# Patient Record
Sex: MALE | Race: WHITE
[De-identification: names, ages, dates, MRNs, and addresses within clinical notes are randomized per-mention and may not be internally consistent; named-entity substitution may affect disease eponyms.]

---

## 2017-10-06 ENCOUNTER — HOSPITAL ENCOUNTER (EMERGENCY)
Dept: HOSPITAL 83 - ED | Age: 37
Discharge: HOME | End: 2017-10-06
Payer: SELF-PAY

## 2017-10-06 VITALS — BODY MASS INDEX: 24.34 KG/M2 | WEIGHT: 170 LBS | HEIGHT: 70 IN
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
## 2017-10-06 DIAGNOSIS — F17.200: ICD-10-CM
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
## 2017-10-06 DIAGNOSIS — Z98.890: ICD-10-CM

## 2017-10-06 DIAGNOSIS — S60.222A: Primary | ICD-10-CM

## 2017-10-06 DIAGNOSIS — Z79.899: ICD-10-CM
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
## 2017-10-06 DIAGNOSIS — R03.0: ICD-10-CM

## 2019-07-27 ENCOUNTER — HOSPITAL ENCOUNTER (INPATIENT)
Dept: HOSPITAL 83 - ED | Age: 39
LOS: 1 days | Discharge: LEFT BEFORE DISCHARGE | DRG: 605 | End: 2019-07-28
Attending: INTERNAL MEDICINE | Admitting: INTERNAL MEDICINE
Payer: SELF-PAY

## 2019-07-27 VITALS — SYSTOLIC BLOOD PRESSURE: 125 MMHG | DIASTOLIC BLOOD PRESSURE: 71 MMHG

## 2019-07-27 VITALS — WEIGHT: 169.76 LBS | BODY MASS INDEX: 23.77 KG/M2 | HEIGHT: 70.87 IN

## 2019-07-27 VITALS — DIASTOLIC BLOOD PRESSURE: 76 MMHG

## 2019-07-27 VITALS — SYSTOLIC BLOOD PRESSURE: 143 MMHG | DIASTOLIC BLOOD PRESSURE: 82 MMHG

## 2019-07-27 DIAGNOSIS — D72.810: ICD-10-CM

## 2019-07-27 DIAGNOSIS — E87.1: ICD-10-CM

## 2019-07-27 DIAGNOSIS — Z83.3: ICD-10-CM

## 2019-07-27 DIAGNOSIS — Z82.3: ICD-10-CM

## 2019-07-27 DIAGNOSIS — Z71.6: ICD-10-CM

## 2019-07-27 DIAGNOSIS — S60.551A: Primary | ICD-10-CM

## 2019-07-27 DIAGNOSIS — D64.9: ICD-10-CM
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
## 2019-07-27 DIAGNOSIS — F17.210: ICD-10-CM

## 2019-07-27 DIAGNOSIS — L03.113: ICD-10-CM
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
## 2019-07-27 DIAGNOSIS — F11.10: ICD-10-CM

## 2019-07-27 DIAGNOSIS — R73.9: ICD-10-CM

## 2019-07-27 DIAGNOSIS — F19.90: ICD-10-CM

## 2019-07-27 LAB
ALBUMIN SERPL-MCNC: 3.2 GM/DL (ref 3.1–4.5)
ALP SERPL-CCNC: 86 U/L (ref 45–117)
ALT SERPL W P-5'-P-CCNC: 24 U/L (ref 12–78)
AST SERPL-CCNC: 11 IU/L (ref 3–35)
BASOPHILS # BLD AUTO: 0 10*3/UL (ref 0–0.1)
BASOPHILS NFR BLD AUTO: 0.4 % (ref 0–1)
BUN SERPL-MCNC: 10 MG/DL (ref 7–24)
CHLORIDE SERPL-SCNC: 105 MMOL/L (ref 98–107)
CREAT SERPL-MCNC: 0.82 MG/DL (ref 0.7–1.3)
EOSINOPHIL # BLD AUTO: 0.1 10*3/UL (ref 0–0.4)
EOSINOPHIL # BLD AUTO: 1.1 % (ref 1–4)
ERYTHROCYTE [DISTWIDTH] IN BLOOD BY AUTOMATED COUNT: 13.4 % (ref 0–14.5)
HCT VFR BLD AUTO: 38.5 % (ref 42–52)
HGB BLD-MCNC: 13.1 G/DL (ref 14–18)
LIPASE SERPL-CCNC: 57 U/L (ref 73–393)
LYMPHOCYTES # BLD AUTO: 2.6 10*3/UL (ref 1.3–4.4)
LYMPHOCYTES NFR BLD AUTO: 23.5 % (ref 27–41)
MCH RBC QN AUTO: 30.7 PG (ref 27–31)
MCHC RBC AUTO-ENTMCNC: 34 G/DL (ref 33–37)
MCV RBC AUTO: 90.2 FL (ref 80–94)
MONOCYTES # BLD AUTO: 0.7 10*3/UL (ref 0.1–1)
MONOCYTES NFR BLD MANUAL: 6.2 % (ref 3–9)
NEUT #: 7.6 10*3/UL (ref 2.3–7.9)
NEUT %: 68.3 % (ref 47–73)
NRBC BLD QL AUTO: 0 % (ref 0–0)
PLATELET # BLD AUTO: 167 10*3/UL (ref 130–400)
PMV BLD AUTO: 11.3 FL (ref 9.6–12.3)
POTASSIUM SERPL-SCNC: 3.6 MMOL/L (ref 3.5–5.1)
PROT SERPL-MCNC: 7.9 GM/DL (ref 6.4–8.2)
RBC # BLD AUTO: 4.27 10*6/UL (ref 4.5–5.9)
SODIUM SERPL-SCNC: 133 MMOL/L (ref 136–145)
WBC NRBC COR # BLD AUTO: 11.1 10*3/UL (ref 4.8–10.8)

## 2019-07-28 VITALS — DIASTOLIC BLOOD PRESSURE: 77 MMHG

## 2019-07-28 VITALS — DIASTOLIC BLOOD PRESSURE: 80 MMHG | SYSTOLIC BLOOD PRESSURE: 138 MMHG

## 2019-07-28 VITALS — SYSTOLIC BLOOD PRESSURE: 114 MMHG | DIASTOLIC BLOOD PRESSURE: 68 MMHG

## 2019-07-28 LAB
ALBUMIN SERPL-MCNC: 2.9 GM/DL (ref 3.1–4.5)
ALP SERPL-CCNC: 82 U/L (ref 45–117)
ALT SERPL W P-5'-P-CCNC: 18 U/L (ref 12–78)
AST SERPL-CCNC: 11 IU/L (ref 3–35)
BASOPHILS # BLD AUTO: 0.1 10*3/UL (ref 0–0.1)
BASOPHILS NFR BLD AUTO: 0.5 % (ref 0–1)
BUN SERPL-MCNC: 11 MG/DL (ref 7–24)
CHLORIDE SERPL-SCNC: 105 MMOL/L (ref 98–107)
CHOLEST SERPL-MCNC: 114 MG/DL (ref ?–200)
CREAT SERPL-MCNC: 0.84 MG/DL (ref 0.7–1.3)
EOSINOPHIL # BLD AUTO: 0.3 10*3/UL (ref 0–0.4)
EOSINOPHIL # BLD AUTO: 2.8 % (ref 1–4)
ERYTHROCYTE [DISTWIDTH] IN BLOOD BY AUTOMATED COUNT: 13.3 % (ref 0–14.5)
HCT VFR BLD AUTO: 36.7 % (ref 42–52)
HDLC SERPL-MCNC: 29 MG/DL (ref 40–60)
HGB BLD-MCNC: 12.5 G/DL (ref 14–18)
LDLC SERPL DIRECT ASSAY-MCNC: 65 MG/DL (ref 9–159)
LYMPHOCYTES # BLD AUTO: 2.5 10*3/UL (ref 1.3–4.4)
LYMPHOCYTES NFR BLD AUTO: 27.5 % (ref 27–41)
MCH RBC QN AUTO: 30.9 PG (ref 27–31)
MCHC RBC AUTO-ENTMCNC: 34.1 G/DL (ref 33–37)
MCV RBC AUTO: 90.8 FL (ref 80–94)
MONOCYTES # BLD AUTO: 0.8 10*3/UL (ref 0.1–1)
MONOCYTES NFR BLD MANUAL: 8.2 % (ref 3–9)
NEUT #: 5.6 10*3/UL (ref 2.3–7.9)
NEUT %: 60.8 % (ref 47–73)
NRBC BLD QL AUTO: 0 10*3/UL (ref 0–0)
PHOSPHATE SERPL-MCNC: 3 MG/DL (ref 2.5–4.9)
PLATELET # BLD AUTO: 167 10*3/UL (ref 130–400)
PMV BLD AUTO: 12.1 FL (ref 9.6–12.3)
POTASSIUM SERPL-SCNC: 3.7 MMOL/L (ref 3.5–5.1)
PROT SERPL-MCNC: 7.3 GM/DL (ref 6.4–8.2)
RBC # BLD AUTO: 4.04 10*6/UL (ref 4.5–5.9)
SODIUM SERPL-SCNC: 136 MMOL/L (ref 136–145)
TRIGL SERPL-MCNC: 99 MG/DL (ref ?–150)
VLDLC SERPL CALC-MCNC: 20 MG/DL (ref 6–40)
WBC NRBC COR # BLD AUTO: 9.2 10*3/UL (ref 4.8–10.8)

## 2019-11-26 ENCOUNTER — HOSPITAL ENCOUNTER (INPATIENT)
Dept: HOSPITAL 83 - 5E | Age: 39
LOS: 3 days | Discharge: HOME | DRG: 897 | End: 2019-11-29
Attending: INTERNAL MEDICINE | Admitting: INTERNAL MEDICINE
Payer: MEDICAID

## 2019-11-26 VITALS — WEIGHT: 178.19 LBS | HEIGHT: 70 IN | BODY MASS INDEX: 25.51 KG/M2

## 2019-11-26 VITALS — SYSTOLIC BLOOD PRESSURE: 110 MMHG | DIASTOLIC BLOOD PRESSURE: 67 MMHG

## 2019-11-26 VITALS — SYSTOLIC BLOOD PRESSURE: 120 MMHG | DIASTOLIC BLOOD PRESSURE: 69 MMHG

## 2019-11-26 VITALS — DIASTOLIC BLOOD PRESSURE: 63 MMHG | SYSTOLIC BLOOD PRESSURE: 117 MMHG

## 2019-11-26 DIAGNOSIS — D72.829: ICD-10-CM

## 2019-11-26 DIAGNOSIS — Z82.3: ICD-10-CM

## 2019-11-26 DIAGNOSIS — R00.0: ICD-10-CM

## 2019-11-26 DIAGNOSIS — Z83.3: ICD-10-CM

## 2019-11-26 DIAGNOSIS — R73.9: ICD-10-CM

## 2019-11-26 DIAGNOSIS — Z71.6: ICD-10-CM

## 2019-11-26 DIAGNOSIS — F17.210: ICD-10-CM

## 2019-11-26 DIAGNOSIS — Z83.79: ICD-10-CM

## 2019-11-26 DIAGNOSIS — F11.23: Primary | ICD-10-CM

## 2019-11-26 LAB
ALBUMIN SERPL-MCNC: 3.8 GM/DL (ref 3.1–4.5)
ALP SERPL-CCNC: 109 U/L (ref 45–117)
ALT SERPL W P-5'-P-CCNC: 37 U/L (ref 12–78)
AMPHETAMINES UR QL SCN: < 1000
APPEARANCE UR: CLEAR
AST SERPL-CCNC: 23 IU/L (ref 3–35)
BACTERIA #/AREA URNS HPF: (no result) /[HPF]
BARBITURATES UR QL SCN: < 200
BASOPHILS # BLD AUTO: 0.1 10*3/UL (ref 0–0.1)
BASOPHILS NFR BLD AUTO: 0.6 % (ref 0–1)
BENZODIAZ UR QL SCN: < 200
BILIRUB UR QL STRIP: NEGATIVE
BUN SERPL-MCNC: 10 MG/DL (ref 7–24)
BZE UR QL SCN: < 300
CANNABINOIDS UR QL SCN: < 50
CHLORIDE SERPL-SCNC: 104 MMOL/L (ref 98–107)
COLOR UR: YELLOW
CREAT SERPL-MCNC: 0.9 MG/DL (ref 0.7–1.3)
EOSINOPHIL # BLD AUTO: 0.3 10*3/UL (ref 0–0.4)
EOSINOPHIL # BLD AUTO: 2.1 % (ref 1–4)
EPI CELLS #/AREA URNS HPF: (no result) /[HPF]
ERYTHROCYTE [DISTWIDTH] IN BLOOD BY AUTOMATED COUNT: 13.7 % (ref 0–14.5)
ETHANOL SERPL-MCNC: < 3 MG/DL (ref ?–3)
GLUCOSE UR QL: NEGATIVE
HCT VFR BLD AUTO: 46.1 % (ref 42–52)
HGB BLD-MCNC: 15.3 G/DL (ref 14–18)
HGB UR QL STRIP: NEGATIVE
INR BLD: 1 (ref 2–3.5)
KETONES UR QL STRIP: NEGATIVE
LEUKOCYTE ESTERASE UR QL STRIP: NEGATIVE
LYMPHOCYTES # BLD AUTO: 2.2 10*3/UL (ref 1.3–4.4)
LYMPHOCYTES NFR BLD AUTO: 18.4 % (ref 27–41)
MCH RBC QN AUTO: 30.8 PG (ref 27–31)
MCHC RBC AUTO-ENTMCNC: 33.2 G/DL (ref 33–37)
MCV RBC AUTO: 92.9 FL (ref 80–94)
METHADONE UR QL SCN: < 300
MONOCYTES # BLD AUTO: 0.6 10*3/UL (ref 0.1–1)
MONOCYTES NFR BLD MANUAL: 4.8 % (ref 3–9)
NEUT #: 8.6 10*3/UL (ref 2.3–7.9)
NEUT %: 73.6 % (ref 47–73)
NITRITE UR QL STRIP: NEGATIVE
NRBC BLD QL AUTO: 0 % (ref 0–0)
OPIATES UR QL SCN: < 300
PCP UR QL SCN: <  25
PH UR STRIP: 6.5 [PH] (ref 5–9)
PLATELET # BLD AUTO: 209 10*3/UL (ref 130–400)
PMV BLD AUTO: 11.5 FL (ref 9.6–12.3)
POTASSIUM SERPL-SCNC: 4 MMOL/L (ref 3.5–5.1)
PROT SERPL-MCNC: 9.1 GM/DL (ref 6.4–8.2)
RBC # BLD AUTO: 4.96 10*6/UL (ref 4.5–5.9)
RBC #/AREA URNS HPF: (no result) RBC/HPF (ref 0–2)
SODIUM SERPL-SCNC: 136 MMOL/L (ref 136–145)
SP GR UR: <= 1.005 (ref 1–1.03)
UROBILINOGEN UR STRIP-MCNC: 0.2 E.U./DL (ref 0.2–1)
WBC #/AREA URNS HPF: (no result) WBC/HPF (ref 0–5)
WBC NRBC COR # BLD AUTO: 11.7 10*3/UL (ref 4.8–10.8)

## 2019-11-26 NOTE — NUR
39 year old MALE admitted to room # 517 for stabilization. Reports an
addiction to HEROIN last used 48 hours prior to admission. Compliant with
admission procedure.  Patient denies any anxiety, but is unable to sit still,
taps toes to floor continuously.  See assessment forms for additional
information about patient status.

## 2019-11-26 NOTE — NUR
Patient displaying withdrawal symptoms, including: irritability, anxiousness,
restlessness and agitation. Scheduled/PRN medications provided, SEE EMAR.
Will continue to monitor medication effectiveness.

## 2019-11-26 NOTE — NUR
PATIENT MEETS [facility]. CINA=19. PATIENT WANTS TO FOLLOW UP WITH
[facility] FOR OUTPATIENT TREATMENT. LOGAN LANZA.

## 2019-11-27 VITALS — DIASTOLIC BLOOD PRESSURE: 71 MMHG

## 2019-11-27 VITALS — DIASTOLIC BLOOD PRESSURE: 65 MMHG | SYSTOLIC BLOOD PRESSURE: 111 MMHG

## 2019-11-27 VITALS — DIASTOLIC BLOOD PRESSURE: 79 MMHG | SYSTOLIC BLOOD PRESSURE: 132 MMHG

## 2019-11-27 VITALS — DIASTOLIC BLOOD PRESSURE: 72 MMHG

## 2019-11-27 NOTE — NUR
Patient displaying withdrawal symptoms, including: irritability, anxiousness,
restlessness and agitation. Scheduled medications provided, SEE EMAR.
Will continue to monitor medication effectiveness.

## 2019-11-27 NOTE — NUR
PATIENT HAS A SCHEDULED APPOINTMENT AT [facility] ON MONDAY, DECEMBER 2ND AT 9:30AM
FOR OUTPATIENT COUNSELING. PATIENT AGREES AND UNDERSTANDS HIS AFTERCARE PLAN.
LOGAN LANZA.

## 2019-11-27 NOTE — NUR
Patient resting, WATCHING TV. Responding to scheduled medications with fewer
complaints of pain and anxiety. CALL LIGHT WITHIN REACH. NO VOICED COMPLAINTS

## 2019-11-28 VITALS — DIASTOLIC BLOOD PRESSURE: 65 MMHG

## 2019-11-28 VITALS — SYSTOLIC BLOOD PRESSURE: 99 MMHG | DIASTOLIC BLOOD PRESSURE: 60 MMHG

## 2019-11-28 VITALS — DIASTOLIC BLOOD PRESSURE: 61 MMHG

## 2019-11-28 VITALS — DIASTOLIC BLOOD PRESSURE: 74 MMHG | SYSTOLIC BLOOD PRESSURE: 130 MMHG

## 2019-11-28 NOTE — NUR
PT GIVEN PO ROBAXIN, REQUIP AND VISTARIL PER PRN ORDER FOR C/O MUSCLE ACHES,
RESTLESS LEGS AND ANXIETY. WILL MONITOR EFFECTIVENESS.

## 2019-11-28 NOTE — NUR
TOOK OVER CARE OF PT. PT IS UP WALKING THROUGHOUT HALLS AND STATES THAT HE HAS
NO COMPLAINTS AT THIS TIME. NO S/S OF DISTRESS ARE NOTED. RESPIRATIONS ARE
UNALBORED. WILL MONITOR.

## 2019-11-28 NOTE — NUR
SLEPT THROUGHOUT NIGHT WITH NO ACUTE DISTRESS NOTED. ROUTINE MEDS PROVIDED
THIS AM TO ASSIST WITH WITHDRAWAL S/S. CALL LIGHT WITHIN REACH. NO VOICED
COMPLAINTS THIS SHIFT

## 2019-11-28 NOTE — NUR
PATIENT REQUESTING TO BE DISCHARGED EARLY DUE TO COURT HEARING TOMORROW AT
9AM.  MADE AWARE AND STATES PATIENT WILL BE DISCHARGED TOMORROW
MORNING ON TIME FOR SCHEDULED APT.

## 2019-11-28 NOTE — NUR
PT GIVEN TRAZODONE AT THIS TIME FOR C/O INSOMNIA/RESTLESSNESS. PT ADVISED TO
LET THIS NURSE KNOW IF MEDICATION IS NOT EFFECTIVE WITHIN 30 MINUTES, AS A
SECOND DOSE CAN BE OFFERED PER PRN ORDER ON EMAR. WILL CONTINUE TO MONITOR.
CALL LIGHT IN REACH.

## 2019-11-28 NOTE — NUR
DR MEJIA NOTIFIED THAT PT IS CONCERNED REGARDING BEING DISCHARGED IN THE
MORNING BEFORE HIS COURT TIME AT 0900.  PHYSICIAN STATES THAT HE WILL LOOK
INTO IT. PT NOTIFIED.

## 2019-11-29 VITALS — DIASTOLIC BLOOD PRESSURE: 62 MMHG

## 2019-11-29 VITALS — DIASTOLIC BLOOD PRESSURE: 60 MMHG

## 2019-11-29 NOTE — NUR
PT RESTING IN BED. NO S/S OF DISTRESS. RESPIRATIONS EASY AND UNLABORED ON ROOM
AIR. CALL LIGHT IN REACH.